# Patient Record
Sex: FEMALE | Race: WHITE
[De-identification: names, ages, dates, MRNs, and addresses within clinical notes are randomized per-mention and may not be internally consistent; named-entity substitution may affect disease eponyms.]

---

## 2020-03-17 NOTE — RADIOLOGY REPORT (SQ)
EXAM DESCRIPTION:  U/S ABDOMEN COMPLETE W/O DOP



COMPLETED DATE/TIME:  3/17/2020 8:40 am



REASON FOR STUDY:  (R10.11)RIGHT UPPER QUADRANT PAIN R10.11  RIGHT UPPER QUADRANT PAIN



COMPARISON:  None.



TECHNIQUE:  Dynamic and static grayscale images acquired of the abdomen and recorded on PACS. Additio
nal selected color Doppler and spectral images recorded.

Note:  Study does not meet criteria for a complete doppler/duplex scan



LIMITATIONS:  None.



FINDINGS:  PANCREAS: No masses. Visualized pancreatic duct normal caliber.

LIVER: Echotexture is coarse with increased echogenicity consistent with fatty infiltration.

LIVER VASCULATURE: Normal directional flow of the main portal vein and hepatic veins.

GALLBLADDER: Gallstones.  Mild gallbladder wall thickening. No pericholecystic fluid.

ULTRASOUND-DETECTED HINSON'S SIGN: Positive.

INTRAHEPATIC DUCTS AND COMMON DUCT: CBD and intrahepatic ducts normal caliber. No filling defects.

INFERIOR VENA CAVA: Normal flow.

AORTA: No aneurysm.

RIGHT KIDNEY:  Normal size. Normal echogenicity. No solid or suspicious masses. No hydronephrosis.  1
 cm up focal calcification.

LEFT KIDNEY:  Normal size. Normal echogenicity. No solid or suspicious masses. No hydronephrosis. No 
calcifications.

SPLEEN:Normal size.  No solid masses.

PERITONEAL AND PLEURAL SPACES: No ascites or effusions.

OTHER: No other significant finding.



IMPRESSION:

1. GALLSTONES.  MILD GALLBLADDER WALL THICKENING.  REPORTED POSITIVE SONOGRAPHIC HINSON SIGN.

2. NONOBSTRUCTING CALCIFICATION IN THE UPPER POLE OF THE RIGHT KIDNEY.

3. FATTY LIVER. NO OTHER SIGNIFICANT FINDING.



TECHNICAL DOCUMENTATION:  JOB ID:  [id number]

 2011 [employer]- All Rights Reserved



Reading location - IP/workstation name: [email or web address]

## 2020-06-02 LAB
ALBUMIN SERPL-MCNC: 4.6 G/DL (ref 3.5–5)
ALP SERPL-CCNC: 45 U/L (ref 38–126)
AMYLASE SERPL-CCNC: 46 U/L (ref 30–110)
ANION GAP SERPL CALC-SCNC: 9 MMOL/L (ref 5–19)
AST SERPL-CCNC: 26 U/L (ref 14–36)
BILIRUB DIRECT SERPL-MCNC: 0 MG/DL (ref 0–0.4)
BILIRUB SERPL-MCNC: 0.6 MG/DL (ref 0.2–1.3)
BUN SERPL-MCNC: 20 MG/DL (ref 7–20)
CALCIUM: 10 MG/DL (ref 8.4–10.2)
CHLORIDE SERPL-SCNC: 103 MMOL/L (ref 98–107)
CO2 SERPL-SCNC: 26 MMOL/L (ref 22–30)
ERYTHROCYTE [DISTWIDTH] IN BLOOD BY AUTOMATED COUNT: 14.2 % (ref 11.5–14)
GLUCOSE SERPL-MCNC: 92 MG/DL (ref 75–110)
HCT VFR BLD CALC: 38.3 % (ref 36–47)
HGB BLD-MCNC: 13.3 G/DL (ref 12–15.5)
MCH RBC QN AUTO: 31.5 PG (ref 27–33.4)
MCHC RBC AUTO-ENTMCNC: 34.7 G/DL (ref 32–36)
MCV RBC AUTO: 91 FL (ref 80–97)
PLATELET # BLD: 262 10^3/UL (ref 150–450)
POTASSIUM SERPL-SCNC: 4.5 MMOL/L (ref 3.6–5)
PROT SERPL-MCNC: 7.1 G/DL (ref 6.3–8.2)
RBC # BLD AUTO: 4.23 10^6/UL (ref 3.72–5.28)
WBC # BLD AUTO: 4.9 10^3/UL (ref 4–10.5)

## 2020-06-02 NOTE — EKG REPORT
SEVERITY:- BORDERLINE ECG -

SINUS RHYTHM

PROBABLE LEFT ATRIAL ABNORMALITY

:

Confirmed by: Meron Cage 02-Jun-2020 19:36:24

## 2020-06-05 ENCOUNTER — HOSPITAL ENCOUNTER (OUTPATIENT)
Dept: HOSPITAL 62 - OROUT | Age: 40
Discharge: HOME | End: 2020-06-05
Attending: SURGERY
Payer: COMMERCIAL

## 2020-06-05 VITALS — SYSTOLIC BLOOD PRESSURE: 116 MMHG | DIASTOLIC BLOOD PRESSURE: 69 MMHG

## 2020-06-05 DIAGNOSIS — K80.20: Primary | ICD-10-CM

## 2020-06-05 DIAGNOSIS — E05.00: ICD-10-CM

## 2020-06-05 DIAGNOSIS — Z79.899: ICD-10-CM

## 2020-06-05 DIAGNOSIS — M06.9: ICD-10-CM

## 2020-06-05 DIAGNOSIS — Z88.5: ICD-10-CM

## 2020-06-05 PROCEDURE — 47562 LAPAROSCOPIC CHOLECYSTECTOMY: CPT

## 2020-06-05 PROCEDURE — 85027 COMPLETE CBC AUTOMATED: CPT

## 2020-06-05 PROCEDURE — 80076 HEPATIC FUNCTION PANEL: CPT

## 2020-06-05 PROCEDURE — 80048 BASIC METABOLIC PNL TOTAL CA: CPT

## 2020-06-05 PROCEDURE — 86901 BLOOD TYPING SEROLOGIC RH(D): CPT

## 2020-06-05 PROCEDURE — 87635 SARS-COV-2 COVID-19 AMP PRB: CPT

## 2020-06-05 PROCEDURE — 93005 ELECTROCARDIOGRAM TRACING: CPT

## 2020-06-05 PROCEDURE — 88304 TISSUE EXAM BY PATHOLOGIST: CPT

## 2020-06-05 PROCEDURE — 81025 URINE PREGNANCY TEST: CPT

## 2020-06-05 PROCEDURE — 86850 RBC ANTIBODY SCREEN: CPT

## 2020-06-05 PROCEDURE — 86900 BLOOD TYPING SEROLOGIC ABO: CPT

## 2020-06-05 PROCEDURE — 36415 COLL VENOUS BLD VENIPUNCTURE: CPT

## 2020-06-05 PROCEDURE — 82150 ASSAY OF AMYLASE: CPT

## 2020-06-05 PROCEDURE — 93010 ELECTROCARDIOGRAM REPORT: CPT

## 2020-06-05 NOTE — OPERATIVE REPORT
Nonrecallable Operative Report


DATE OF SURGERY: 06/05/20


PREOPERATIVE DIAGNOSIS: symptomatic cholelithiasis


POSTOPERATIVE DIAGNOSIS: symptomatic cholelithiasis


OPERATION: Laparoscopic cholecystectomy


1ST ASSISTANT: J LUIS PHAN


ANESTHESIA: GA


TISSUE REMOVED OR ALTERED: Gallbladder


COMPLICATIONS: 





None


ESTIMATED BLOOD LOSS: 10 cc


INTRAOPERATIVE FINDINGS: See note


PROCEDURE: 





After obtaining informed consent, the patient was taken to the operating room.  

General  Anesthesia was induced; the arms were extended, and the abdomen was 

exposed, and prepped and draped in a sterile fashion.  Instrumentation was set 

up for laparoscopic cholecystectomy.


 


Surgical plan and surgical timeout were conducted.


 


A vertical incision was made above the umbilicus, and a verres needle was 

inserted uneventfully into the peritoneal cavity.  Pneumoperitoneum was 

established.


 


The verres needle was removed and a 10 mm trocar was inserted and a 10 mm 

laparoscope was inserted.  Visualization of the peritoneal cavity confirmed safe

uneventful entry.  Under direct visualization 3 additional 5 mm ports were 

established, one in the subxiphoid position and second in the subcostal 

position.  Visualization of the hepatobiliary anatomy revealed no anatomic 

variations.  A grasper was placed on the fundus of the gallbladder and the 

gallbladder is elevated over the right surface of the liver; a second grasper 

was used to grasp the infundibulum of the gallbladder.  The neck of the 

gallbladder and junction with the cystic duct was dissected out.  The Cystic 

artery was in its usual location medial and cephalad to the cystic duct.  The 

cystic artery was surrounded with a right angle clamp, clipped twice proximally 

and divided with laparoscopic scissors.


 


We now opened the triangle of Calot by dividing the peritoneal reflection on 

both the medial and lateral sides of the cystic duct infundibular junction.  The

critical view was obtained.  We now milked the cystic duct of any possible 

stones, clipped the cystic duct approximately 2 times once distally and divided 

with scissors.


 


The gallbladder was now removed from the undersurface of the liver using hook 

cautery dissection.  Graspers were repositioned and the gallbladder was removed 

uneventfully from the abdominal cavity through the super umbilical port site 

incision.  The specimen was examined, then passed off to pathology for permanent

analysis.


 


We returned to the peritoneal cavity check for bleeding, and evidence of bile 

leak, and there was none.  We  Confirmed satisfactory placement of clips on 

cystic duct and cystic artery were secured .  At this point we felt  the 

operation was complete.  The subcutaneous tissue was then anesthetized  with 

quarter percent Marcaine Sponge and needle counts are correct.  All ports 

removed under direct visualization pneumoperitoneum evacuated, and 5 mm port 

wounds closed with 3-0 Vicryl suture, benzoin and Steri-Strips.


 


The patient was extubated, and taken to the recovery room in stable condition.